# Patient Record
Sex: FEMALE | Race: WHITE
[De-identification: names, ages, dates, MRNs, and addresses within clinical notes are randomized per-mention and may not be internally consistent; named-entity substitution may affect disease eponyms.]

---

## 2018-03-26 ENCOUNTER — HOSPITAL ENCOUNTER (EMERGENCY)
Dept: HOSPITAL 17 - NEPC | Age: 28
Discharge: HOME | End: 2018-03-26
Payer: SELF-PAY

## 2018-03-26 VITALS — SYSTOLIC BLOOD PRESSURE: 124 MMHG | DIASTOLIC BLOOD PRESSURE: 78 MMHG

## 2018-03-26 VITALS — WEIGHT: 165.35 LBS | HEIGHT: 68 IN | BODY MASS INDEX: 25.06 KG/M2

## 2018-03-26 VITALS
DIASTOLIC BLOOD PRESSURE: 94 MMHG | SYSTOLIC BLOOD PRESSURE: 144 MMHG | RESPIRATION RATE: 20 BRPM | OXYGEN SATURATION: 98 % | HEART RATE: 92 BPM

## 2018-03-26 VITALS
RESPIRATION RATE: 18 BRPM | DIASTOLIC BLOOD PRESSURE: 74 MMHG | SYSTOLIC BLOOD PRESSURE: 134 MMHG | OXYGEN SATURATION: 97 % | HEART RATE: 80 BPM

## 2018-03-26 VITALS
TEMPERATURE: 98.4 F | HEART RATE: 92 BPM | OXYGEN SATURATION: 98 % | DIASTOLIC BLOOD PRESSURE: 71 MMHG | RESPIRATION RATE: 16 BRPM | SYSTOLIC BLOOD PRESSURE: 129 MMHG

## 2018-03-26 DIAGNOSIS — F17.200: ICD-10-CM

## 2018-03-26 DIAGNOSIS — F19.10: ICD-10-CM

## 2018-03-26 DIAGNOSIS — F10.929: Primary | ICD-10-CM

## 2018-03-26 DIAGNOSIS — R74.8: ICD-10-CM

## 2018-03-26 DIAGNOSIS — Z79.899: ICD-10-CM
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
## 2018-03-26 DIAGNOSIS — F41.9: ICD-10-CM

## 2018-03-26 LAB
ALBUMIN SERPL-MCNC: 3.7 GM/DL (ref 3.4–5)
ALP SERPL-CCNC: 98 U/L (ref 45–117)
ALT SERPL-CCNC: 77 U/L (ref 10–53)
APAP SERPL-MCNC: (no result) MCG/ML (ref 10–30)
AST SERPL-CCNC: 451 U/L (ref 15–37)
BACTERIA #/AREA URNS HPF: (no result) /HPF
BASOPHILS # BLD AUTO: 0.1 TH/MM3 (ref 0–0.2)
BASOPHILS NFR BLD: 0.8 % (ref 0–2)
BILIRUB SERPL-MCNC: 0.8 MG/DL (ref 0.2–1)
BUN SERPL-MCNC: 5 MG/DL (ref 7–18)
CALCIUM SERPL-MCNC: 8.4 MG/DL (ref 8.5–10.1)
CHLORIDE SERPL-SCNC: 113 MEQ/L (ref 98–107)
COLOR UR: COLORLESS
CREAT SERPL-MCNC: 0.8 MG/DL (ref 0.5–1)
EOSINOPHIL # BLD: 0 TH/MM3 (ref 0–0.4)
EOSINOPHIL NFR BLD: 0.7 % (ref 0–4)
ERYTHROCYTE [DISTWIDTH] IN BLOOD BY AUTOMATED COUNT: 19.7 % (ref 11.6–17.2)
GFR SERPLBLD BASED ON 1.73 SQ M-ARVRAT: 85 ML/MIN (ref 89–?)
GLUCOSE SERPL-MCNC: 79 MG/DL (ref 74–106)
GLUCOSE UR STRIP-MCNC: (no result) MG/DL
HCO3 BLD-SCNC: 17.9 MEQ/L (ref 21–32)
HCT VFR BLD CALC: 33.3 % (ref 35–46)
HGB BLD-MCNC: 10.5 GM/DL (ref 11.6–15.3)
HGB UR QL STRIP: (no result)
INR PPP: 1 RATIO
KETONES UR STRIP-MCNC: (no result) MG/DL
LYMPHOCYTES # BLD AUTO: 1.3 TH/MM3 (ref 1–4.8)
LYMPHOCYTES NFR BLD AUTO: 20.8 % (ref 9–44)
MAGNESIUM SERPL-MCNC: 1.9 MG/DL (ref 1.5–2.5)
MCH RBC QN AUTO: 25.7 PG (ref 27–34)
MCHC RBC AUTO-ENTMCNC: 31.4 % (ref 32–36)
MCV RBC AUTO: 81.6 FL (ref 80–100)
MONOCYTE #: 0.4 TH/MM3 (ref 0–0.9)
MONOCYTES NFR BLD: 6.7 % (ref 0–8)
NEUTROPHILS # BLD AUTO: 4.5 TH/MM3 (ref 1.8–7.7)
NEUTROPHILS NFR BLD AUTO: 71 % (ref 16–70)
NITRITE UR QL STRIP: (no result)
PLATELET # BLD: 324 TH/MM3 (ref 150–450)
PMV BLD AUTO: 8.1 FL (ref 7–11)
PROT SERPL-MCNC: 7.9 GM/DL (ref 6.4–8.2)
PROTHROMBIN TIME: 10.4 SEC (ref 9.8–11.6)
RBC # BLD AUTO: 4.08 MIL/MM3 (ref 4–5.3)
SODIUM SERPL-SCNC: 143 MEQ/L (ref 136–145)
SP GR UR STRIP: 1 (ref 1–1.03)
URINE LEUKOCYTE ESTERASE: (no result)
WBC # BLD AUTO: 6.4 TH/MM3 (ref 4–11)

## 2018-03-26 PROCEDURE — 83690 ASSAY OF LIPASE: CPT

## 2018-03-26 PROCEDURE — 85025 COMPLETE CBC W/AUTO DIFF WBC: CPT

## 2018-03-26 PROCEDURE — 83930 ASSAY OF BLOOD OSMOLALITY: CPT

## 2018-03-26 PROCEDURE — 85730 THROMBOPLASTIN TIME PARTIAL: CPT

## 2018-03-26 PROCEDURE — 83735 ASSAY OF MAGNESIUM: CPT

## 2018-03-26 PROCEDURE — 83935 ASSAY OF URINE OSMOLALITY: CPT

## 2018-03-26 PROCEDURE — 96366 THER/PROPH/DIAG IV INF ADDON: CPT

## 2018-03-26 PROCEDURE — 71045 X-RAY EXAM CHEST 1 VIEW: CPT

## 2018-03-26 PROCEDURE — 85610 PROTHROMBIN TIME: CPT

## 2018-03-26 PROCEDURE — 80307 DRUG TEST PRSMV CHEM ANLYZR: CPT

## 2018-03-26 PROCEDURE — 84703 CHORIONIC GONADOTROPIN ASSAY: CPT

## 2018-03-26 PROCEDURE — 81001 URINALYSIS AUTO W/SCOPE: CPT

## 2018-03-26 PROCEDURE — 93005 ELECTROCARDIOGRAM TRACING: CPT

## 2018-03-26 PROCEDURE — 70450 CT HEAD/BRAIN W/O DYE: CPT

## 2018-03-26 PROCEDURE — 99285 EMERGENCY DEPT VISIT HI MDM: CPT

## 2018-03-26 PROCEDURE — 96365 THER/PROPH/DIAG IV INF INIT: CPT

## 2018-03-26 PROCEDURE — 82140 ASSAY OF AMMONIA: CPT

## 2018-03-26 PROCEDURE — 80053 COMPREHEN METABOLIC PANEL: CPT

## 2018-03-26 NOTE — RADRPT
EXAM DATE/TIME:  03/26/2018 03:52 

 

[facility] COMPARISON:     

No previous studies available for comparison.

 

                     

INDICATIONS :     

Cough.

                     

 

MEDICAL HISTORY :     

None.          

 

SURGICAL HISTORY :     

None.   

 

ENCOUNTER:     

Initial                                        

 

ACUITY:     

1 day      

 

PAIN SCORE:     

0/10

 

LOCATION:     

Bilateral  chest

 

FINDINGS:     

A single view of the chest demonstrates minimal bibasilar densities. Heart normal in size. Diminished
 lung volumes.  The cardiomediastinal contours are unremarkable.  Osseous structures are intact.

 

CONCLUSION:     Bibasilar densities.

 

 

 

 Andrew Duque MD on March 26, 2018 at 4:28           

Board Certified Radiologist.

 This report was verified electronically.

## 2018-03-26 NOTE — RADRPT
EXAM DATE/TIME:  03/26/2018 03:59 

 

[facility] COMPARISON:     

No previous studies available for comparison.

 

 

INDICATIONS :     

Altered mental status.

                      

 

RADIATION DOSE:     

56.35 CTDIvol (mGy) 

 

 

 

MEDICAL HISTORY :       

Substance abuse

 

SURGICAL HISTORY :      

Gastric bypass. Appendectomy.Cholecystectomy.

 

ENCOUNTER:      

Initial

 

ACUITY:      

1 day

 

PAIN SCALE:      

0/10

 

LOCATION:        

cranial 

 

TECHNIQUE:     

Multiple contiguous axial images were obtained of the head.  Using automated exposure control and adj
ustment of the mA and/or kV according to patient size, radiation dose was kept as low as reasonably a
chievable to obtain optimal diagnostic quality images.   DICOM format image data is available electro
nically for review and comparison.  

 

FINDINGS:     

 

CEREBRUM:     

The ventricles are normal for age.  No evidence of midline shift, mass lesion, hemorrhage or acute in
farction.  No extra-axial fluid collections are seen.

 

POSTERIOR FOSSA:     

The cerebellum and brainstem are intact.  The 4th ventricle is midline.  The cerebellopontine angle i
s unremarkable.

 

EXTRACRANIAL:     

The visualized portion of the orbits is intact.

 

SKULL:     

The calvaria is intact.  No evidence of skull fracture.

 

CONCLUSION:     

Normal examination.  

 

 

 

 Andrew Duque MD on March 26, 2018 at 4:10           

Board Certified Radiologist.

 This report was verified electronically.

## 2018-03-26 NOTE — PD
HPI


Chief Complaint:  Altered Mental Status


Time Seen by Provider:  03:37


Travel History


International Travel<30 days:  No


Contact w/Intl Traveler<30days:  No


Traveled to known affect area:  No





History of Present Illness


HPI


The patient is a 28 year old female who presents to the [facility] 

emergency department with a history of reportedly recently being under a 

significant amount of stress.  She reports that she was having difficulty 

sleeping, therefore she drank vodka and then took 2 clonazepam tablets.  She 

reports that she is on clonazepam for anxiety, however she does not take them 

on a regular basis.  He also reports that she does not drink regularly.  She 

denies having any suicidal or homicidal ideations.  She was found by her 

boyfriend on the bed thrashing around and altered, therefore the patient was 

brought in by ambulance services.  On review of systems otherwise, the patient 

denies having any recent fevers, cough, congestion, neck pain, chest pain, 

shortness of breath, abdominal pain, vomiting, diarrhea, urinary symptoms, or 

neurologic symptoms. 





LMP: One week ago





[facility]


Past Medical History


*** Narrative Medical


The patient's past medical history is significant for anxiety disorder and 

history of kidney stones


Anxiety:  Yes


Kidney Stones:  Yes


Influenza Vaccination:  No


Pregnant?:  Not Pregnant


LMP:  1 week ago





Past Surgical History


*** Narrative Surgical


Cholecystectomy per


Abdominal Surgery:  Yes (GASTRIC BYPASS)


Appendectomy:  Yes


Cholecystectomy:  Yes


Genitourinary Surgery:  Yes (LITHOTRIPSY)





Social History


Alcohol Use:  Yes (SOCIAL )


Tobacco Use:  Yes (1PPD)


Substance Use:  No





Allergies-Medications


(Allergen,Severity, Reaction):  


Coded Allergies:  


     Penicillins (Verified  Allergy, Unknown, Hives, 3/26/18)


Reported Meds & Prescriptions





Reported Meds & Active Scripts


Active


Reported


Clonazepam 0.5 Mg Tab 0.5 Mg PO BID





Narrative Medication


The patient also reports taking sertraline.





Review of Systems


Except as stated in HPI:  all other systems reviewed are Neg


General / Constitutional:  No: Fever


Eyes:  No: Visual changes


HENT:  No: Headaches


Cardiovascular:  No: Chest Pain or Discomfort


Respiratory:  No: Shortness of Breath


Gastrointestinal:  No: Abdominal Pain


Genitourinary:  No: Dysuria


Musculoskeletal:  No: Pain


Skin:  No Rash


Neurologic:  Positive: Change in Mentation, Slurred Speech, No: Weakness, Focal 

Abnormalities, Sensory Disturbance


Psychiatric:  Positive: Mood Disorder, No: Depression, Suicidal Ideations, 

Homicidal Ideation


Endocrine:  No: Polydipsia


Hematologic/Lymphatic:  No: Easy Bruising





Physical Exam


Narrative


General: 


The patient is a well-developed well-nourished female in no acute distress, 

drowsy on examination on arrival. 





Head and Neck exam: 


Head is normocephalic atraumatic. 


Eyes: EOMI, pupils are dilated to 6 mm although reactive to light bilaterally.


Nose: Midline septum with pink mucous membranes 


Mouth: Dentition unremarkable. Moist mucus membranes. Posterior oropharynx is 

not erythematous. No tonsillar hypertrophy. Uvula midline. Airway patent. 


Neck: No palpable lymphadenopathy. No nuchal rigidity. No thyromegaly. 





Cardiovascular: 


Regular rate and rhythm without murmurs, gallops, or rubs.


Lungs: 


Clear to auscultation bilaterally. No wheezes, rhonchi, or rales.


 


Abdomen:


Soft, without tenderness to palpation in all 4 quadrants of the abdomen. No 

guarding, rebound, or rigidity.  Normal bowel sounds are audible.  No 

tenderness on palpation of McBurney's point.  Negative Mueller sign.





Extremities: 


No clubbing, cyanosis, or edema. 2+ pulses in all 4 extremities.  No calf 

tenderness on palpation.





Back: 


No spinous process tenderness to palpation. No costovertebral angle tenderness 

to palpation. 





Neurologic Exam: Grossly nonfocal.  The patient has an odor of alcohol about 

her.  The patient is oriented to person and place, however not time or 

situation.





Skin Exam: No rash noted. Intact skin that is warm and dry.





Data


Data


Last Documented VS





Vital Signs








  Date Time  Temp Pulse Resp B/P (MAP) Pulse Ox O2 Delivery O2 Flow Rate FiO2


 


3/26/18 05:39  92 20 144/94 (111) 98 Room Air  


 


3/26/18 03:36 98.4       








Orders





 Orders


Complete Blood Count With Diff (3/26/18 03:41)


Comprehensive Metabolic Panel (3/26/18 03:41)


Prothrombin Time / Inr (Pt) (3/26/18 03:41)


Act Partial Throm Time (Ptt) (3/26/18 03:41)


Lipase (3/26/18 03:41)


Urinalysis - C+S If Indicated (3/26/18 03:41)


Magnesium (Mg) (3/26/18 03:41)


Ammonia (3/26/18 03:41)


Osmolality, Urine (3/26/18 03:41)


Osmolality,Serum (3/26/18 03:41)


Chest, Single Ap (3/26/18 03:41)


Ct Brain W/O Iv Contrast(Rout) (3/26/18 03:41)


Iv Access Insert/Monitor (3/26/18 03:41)


Ecg Monitoring (3/26/18 03:41)


Oximetry (3/26/18 03:41)


Ed Urine Pregnancytest Poc (3/26/18 03:41)


Drug Screen, Random Urine (3/26/18 03:41)


Alcohol (Ethanol) (3/26/18 03:41)


Salicylates (Aspirin) (3/26/18 03:41)


Tylenol (Acetaminophen) (3/26/18 03:41)


Sodium Chlor 0.9% 1000 Ml Inj (Ns 1000 M (3/26/18 04:45)


Thiamine Inj (Thiamine Inj) (3/26/18 04:45)


Electrocardiogram (3/26/18 03:42)


Sodium Chlor 0.9% 1000 Ml Inj (Ns 1000 M (3/26/18 07:15)





Labs





Laboratory Tests








Test


  3/26/18


03:35 3/26/18


04:30 3/26/18


04:38


 


White Blood Count 6.4 TH/MM3   


 


Red Blood Count 4.08 MIL/MM3   


 


Hemoglobin 10.5 GM/DL   


 


Hematocrit 33.3 %   


 


Mean Corpuscular Volume 81.6 FL   


 


Mean Corpuscular Hemoglobin 25.7 PG   


 


Mean Corpuscular Hemoglobin


Concent 31.4 % 


  


  


 


 


Red Cell Distribution Width 19.7 %   


 


Platelet Count 324 TH/MM3   


 


Mean Platelet Volume 8.1 FL   


 


Neutrophils (%) (Auto) 71.0 %   


 


Lymphocytes (%) (Auto) 20.8 %   


 


Monocytes (%) (Auto) 6.7 %   


 


Eosinophils (%) (Auto) 0.7 %   


 


Basophils (%) (Auto) 0.8 %   


 


Neutrophils # (Auto) 4.5 TH/MM3   


 


Lymphocytes # (Auto) 1.3 TH/MM3   


 


Monocytes # (Auto) 0.4 TH/MM3   


 


Eosinophils # (Auto) 0.0 TH/MM3   


 


Basophils # (Auto) 0.1 TH/MM3   


 


CBC Comment DIFF FINAL   


 


Differential Comment    


 


Blood Urea Nitrogen 5 MG/DL   


 


Creatinine 0.80 MG/DL   


 


Random Glucose 79 MG/DL   


 


Total Protein 7.9 GM/DL   


 


Albumin 3.7 GM/DL   


 


Calcium Level 8.4 MG/DL   


 


Magnesium Level 1.9 MG/DL   


 


Alkaline Phosphatase 98 U/L   


 


Aspartate Amino Transf


(AST/SGOT) 451 U/L 


  


  


 


 


Alanine Aminotransferase


(ALT/SGPT) 77 U/L 


  


  


 


 


Total Bilirubin 0.8 MG/DL   


 


Sodium Level 143 MEQ/L   


 


Potassium Level 3.9 MEQ/L   


 


Chloride Level 113 MEQ/L   


 


Carbon Dioxide Level 17.9 MEQ/L   


 


Anion Gap 12 MEQ/L   


 


Estimat Glomerular Filtration


Rate 85 ML/MIN 


  


  


 


 


Lipase 279 U/L   


 


Salicylates Level


  LESS THAN 1.7


MG/DL 


  


 


 


Acetaminophen Level


  LESS THAN 2.0


MCG/ML 


  


 


 


Ethyl Alcohol Level 156 MG/DL   


 


Prothrombin Time  10.4 SEC  


 


Prothromb Time International


Ratio 


  1.0 RATIO 


  


 


 


Activated Partial


Thromboplast Time 


  22.6 SEC 


  


 


 


Serum Osmolality  328 MOSM/KG  


 


Ammonia   51 MCMOL/L 











MDM


Medical Decision Making


Medical Screen Exam Complete:  Yes


Emergency Medical Condition:  Yes


Medical Record Reviewed:  Yes


Differential Diagnosis


Alcohol intoxication, versus other substance intoxication, versus postictal 

state, versus intracranial abnormality, versus encephalopathy


Narrative Course


During the course of the patient's emergency department visit, the patient's 

history, examination, and differential diagnosis were reviewed with the 

patient. The patient was placed on a cardiac monitor with oximetry and frequent 

blood pressure monitoring. The patient had  IV access obtained and blood work 

sent for analysis.  The patient had an EKG done on arrival that shows a sinus 

rhythm heart rate of 91, QRS duration is 83 ms,  ms.  No acute ST 

segment elevation.





The patient was initially provided normal saline 1 L IV fluid bolus, thiamine 

100 mg IV





The patient's laboratory studies were reviewed and remarkable for a white count 

of 6.4, hemoglobin 10.5, platelets 324 with 71 neutrophils, CMP is remarkable 

for a chloride of 113, CO2 17.9, BUN 5, GFR of 85, , ALT 77, ammonia 

level 51.  PT 10.4, PTT 22.6.  Salicylate less than 1.7, acetaminophen less 

than 2, alcohol level 156





Radiology studies were reviewed and remarkable for a chest x-ray that shows no 

acute abnormality, atelectasis at the bases is noted.  CT scan of the brain 

shows normal examination.





The patient on reexamination after observation in the emergency department has 

become more awake and alert and oriented 4.  The patient had elevated liver 

enzymes with mildly elevated ammonia level.  The patient has no asterixis.  The 

patient has no tremulousness.  The patient was instructed regarding her 

elevated liver enzymes which is likely alcohol induced.  The patient is 

instructed to follow-up with her primary care physician for repeat liver 

function tests in 1 week.  The patient is instructed to avoid alcohol and 

Tylenol.





The patient is resting comfortably and feels better, is alert and in no 

distress. The patient's results and examination findings were discussed with 

the patient. The repeat examination is unremarkable and benign. The history, 

exam, diagnostic testing, and current condition do not suggest any significant 

pathology to warrant further testing, continued ED treatment, admission, or 

surgical evaluation at this point. The vital signs have been stable. The 

patient does not have uncontrollable pain, intractable vomiting, or other 

significant symptoms. The patient's condition is stable and appropriate for 

discharge. The patient will pursue further outpatient evaluation with a primary 

care physician or other designated or consulting physician as indicated in the 

discharge instructions. The patient expressed understanding and was agreeable 

with this plan.





Diagnosis





 Primary Impression:  


 Elevated liver enzymes


 Additional Impressions:  


 Alcohol intoxication


 Qualified Codes:  F10.929 - Alcohol use, unspecified with intoxication, 

unspecified


 Polysubstance abuse


Referrals:  


[facility]


1 week


Patient Instructions:  Alcohol Intoxication (ED), General Instructions, 

Polysubstance Abuse (ED)





***Additional Instructions:  


The patient had elevated liver enzymes with mildly elevated ammonia level.  The 

patient has no asterixis.  The patient has no tremulousness.  The patient was 

instructed regarding her elevated liver enzymes which is likely alcohol 

induced.  The patient is instructed to follow-up with her primary care 

physician for repeat liver function tests in 1 week.  The patient is instructed 

to avoid alcohol and Tylenol.  The patient is given an outpatient lab slip to 

obtain this repeat elevated liver enzyme tests in 1 week.


***Med/Other Pt SpecificInfo:  No Change to Teagan Phillips MD Mar 26, 2018 04:19

## 2018-03-27 NOTE — EKG
Date Performed: 03/26/2018       Time Performed: 03:42:47

 

PTAGE:      28 years

 

EKG:      Sinus rhythm 

 

 NORMAL ECG 

 

NO PREVIOUS TRACING            

 

DOCTOR:   Riya Jerez  Interpretating Date/Time  03/27/2018 00:08:15